# Patient Record
(demographics unavailable — no encounter records)

---

## 2024-12-18 NOTE — PROCEDURE
[FreeTextEntry1] :  	Prior exams reviewed:  PFT: restricted, low volumes, dlco moderately reduced.  declined from prior.  CXR: stable ILD, worse RUL, no effusions     EXAM: [id number] - CT CHEST  - ORDERED BY: NIKOS UNDERWOOD   PROCEDURE DATE:  12/21/2023    INTERPRETATION:  Clinical information: Pulmonary fibrosis. Exam is compared to previous study of 12/19/2022.  CT scan of the chest was obtained without administration of intravenous contrast.  Small calcification is noted in the left lobe of the thyroid gland.  Few small lymph nodes are present in the pretracheal space and AP window.  Heart is normal in size. No pericardial effusion is noted.  No endobronchial lesions are noted.  Again, patchy ground glass/reticular opacities containing inter-/intralobular septal thickening and traction bronchiectasis are noted within both lungs, more so in both upper lobes. No honeycomb cysts are noted. Appearance is unchanged when compared to previous exam. No pleural effusions are noted.  Below the diaphragm, visualized portions of the abdomen demonstrate patient to be status post cholecystectomy.  Degenerative changes of the spine are noted.  IMPRESSION: Stable ground glass/reticular opacities within both lungs when compared to previous exam.  --- End of Report ---       JOSH ASHLEY MD; Attending Radiologist This document has been electronically signed. Dec 26 2023  9:28AM Reviewed:  PFT: restricted.  Lung volumes reduced.   DLCO normal when corrected for volumes.  slight improvement from prior.   EXAM: [id number] - CT CHEST - ORDERED BY: NIKOS UNDERWOOD   PROCEDURE DATE: 12/19/2022    INTERPRETATION: CLINICAL INFORMATION: History of Covid-19 pneumonia, evaluate for interstitial lung disease.  COMPARISON: CT chest 5/22/2020  CONTRAST/COMPLICATIONS: IV Contrast: NONE Oral Contrast: NONE Complications: None reported at time of study completion  PROCEDURE: CT scan of the chest was obtained without intravenous contrast.  FINDINGS:  LYMPH NODES: No thoracic lymphadenopathy.  HEART/VASCULATURE: Normal cardiac size. No pericardial effusion. Atherosclerotic calcifications of the aorta. Nonaneurysmal thoracic aorta. Normal caliber main pulmonary artery.  AIRWAYS/LUNGS/PLEURA: Patent central airways. Prior diffuse lung abnormality evolved to upper lobe and central predominant groundglass, with associated traction bronchiectasis, as well as both peripheral and peribronchovascular reticular opacities. The groundglass component is decreased Calcified granuloma in the right lower lobe (2:64). No pneumothorax. No pleural effusion.  UPPER ABDOMEN: Unremarkable visualized upper abdomen  BONES/SOFT TISSUES: The soft tissues are unremarkable. Degenerative changes of the thoracic spine.   IMPRESSION:  Interstitial lung disease/fibrosis compatible with COVID associated lung injury.    --- End of Report ---    EULALIA JIMENEZ DO; Resident Radiologist This document has been electronically signed. SAMEER KEMP M.D., ATTENDING RADIOGIST This document has been electronically signed. Dec 20 2022 4:15PM Reviewed:  cxr: diffuse bilateral increase interstital markings, no effusions, possible small consolidation RUL, cardiac silhouette appears normal, no richard abnormality.  PFT: restrictive physiology without bd response, lung volumes decreased, DLCO mod reduced.    EXAM: CT ANGIO CHEST (W)AW IC   PROCEDURE DATE: 05/22/2020    INTERPRETATION: CT ANGIO CHEST WITHOUT AND OR WITH IV CONTRAST   CLINICAL INFORMATION: chest pressure, shortness of breath   TECHNIQUE: Contrast enhanced CT pulmonary angiogram was performed.  Multiplanar CT and HRCT images were reviewed. Maximum intensity projection  (MIP) images are reconstructed as per CT angiography protocol. Images were  acquired during the administration of 75 cc Omnipaque 350 IV contrast. This  study was performed using automatic exposure control (radiation dose  reduction software) to obtain a diagnostic image quality scan with patient  dose as low as reasonably achievable.   COMPARISON: Chest x-ray 5/15/2020   PULMONARY ARTERIES: No pulmonary embolism to the proximal segmental  branches. Limited visualization of distal segmental and subsegmental  branches secondary to a combination of respiratory motion and suboptimal  bolus timing.   LUNGS, AIRWAYS: The central airways are patent. Diffuse bilateral  groundglass opacity with coarse septal thickening. Additional areas of  scarring and bronchiectasis.   PLEURA: No pleural abnormality.   VESSELS: Normal caliber aorta. No dissection.   HEART: Normal heart size. No pericardial effusion.   MEDIASTINUM, SURJIT, AXILLAE: No adenopathy.   UPPER ABDOMEN: Limited visualization is unremarkable.   BONES AND CHEST WALL: No acute bony abnormality.   IMPRESSION:   No pulmonary embolism to the proximal segmental branches. Limited  visualization of distal segmental and subsegmental branches secondary to a  combination of respiratory motion and suboptimal bolus timing.   Diffuse bilateral groundglass opacity with coarse septal thickening likely  representing evolution of COVID 19 pneumonia. Additional areas of scarring  and bronchiectasis.

## 2024-12-18 NOTE — HISTORY OF PRESENT ILLNESS
[TextBox_4] : doing well breathing/cough is ok using trelegy but not all days due for ILD ct scan fu

## 2025-01-08 NOTE — PHYSICAL EXAM
[General Appearance - Alert] : alert [General Appearance - Well Nourished] : well nourished [General Appearance - Well-Appearing] : healthy appearing [Oriented To Time, Place, And Person] : oriented to person, place, and time [Person] : oriented to person [Place] : oriented to place [Time] : oriented to time [Motor Tone] : muscle tone was normal in all four extremities [Motor Handedness Right-Handed] : the patient is right hand dominant [Sclera] : the sclera and conjunctiva were normal [Outer Ear] : the ears and nose were normal in appearance [Neck Appearance] : the appearance of the neck was normal [] : no respiratory distress [Respiration, Rhythm And Depth] : normal respiratory rhythm and effort [Exaggerated Use Of Accessory Muscles For Inspiration] : no accessory muscle use [Heart Rate And Rhythm] : heart rate was normal and rhythm regular [Abnormal Walk] : normal gait [Involuntary Movements] : no involuntary movements were seen [Skin Color & Pigmentation] : normal skin color and pigmentation [Limited Balance] : balance was intact

## 2025-01-08 NOTE — ASSESSMENT
[FreeTextEntry1] : IMPRESSION: 58-year-old female right hand dominant female with a history of HTN, DM, HLD, neuropathic pain (on Gabapentin), post Covid ILD, who was admitted 12/8/24 for dizziness, s/p fall, nausea, vomiting generalized weakness.  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   Pt presents for a follow up visit - states that she is feeling well.  Reports brief intermittent left occipital squeezing sensation and a left. Reviewed the CTA with patient and explained the findings. The small MCA aneurysm presents very low risk, with studies suggesting annual rupture risk of less than 1%. Would therefore recommend monitoring with annual MRA.   PLAN: MRA brain non contrast in 6 months F/U after imaging Advised patient to maintain good blood pressure control.

## 2025-01-08 NOTE — HISTORY OF PRESENT ILLNESS
[FreeTextEntry1] : CTA HEAD: 12/7/24 - Patent intracranial circulation without flow limiting stenosis. 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  Olimpia Gonzalez is a 58-year-old female with a history of HTN, DM, HLD, neuropathic pain (on Gabapentin), post Covid ILD, who was admitted 12/8/24 for dizziness, s/p fall, nausea, vomiting generalized weakness.  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   Pt presents for a follow up visit - states that she is feeling well.  Reports brief intermittent left occipital squeezing sensation and a left.  Pt was never a smoker and denies a family history of aneurysms.

## 2025-06-11 NOTE — PHYSICAL EXAM
[Person] : oriented to person [Place] : oriented to place [Time] : oriented to time [Abnormal Walk] : normal gait [Motor Tone] : muscle tone was normal in all four extremities

## 2025-06-12 NOTE — HISTORY OF PRESENT ILLNESS
[FreeTextEntry1] : OMAYRA LOJA is a 59 year old right hand dominant female with PMH of HTN, HLD, DM 2, ILD, arthritis, chronic low back pain, depression  who presented to [facility] ED on 12/7/24 with severe sudden WHOL, dizziness, nausea, difficulty speaking, generalized weakness. CTA head/neck 12/7/24 showed 2mm left MCA bifurcation aneurysm. Attempted lumbar puncture x2 unsuccessful, no CSF obtained. MRI brain negative for hemorrhage or infarct.  Today, patient presents for follow up to review results of MRA of the brain obtained on 6/6/25. No new complaints since our last visit.

## 2025-06-12 NOTE — ASSESSMENT
[FreeTextEntry1] : IMPRESSION: 59F RHD, PMH of HTN, HLD, DM 2, ILD, arthritis, chronic low back pain, depression, p/w WHOL, dizziness, nausea, difficulty speaking, generalized weakness to [facility] ED. CTA head/neck 12/7/24 showed 2mm left MCA bifurcation aneurysm. Attempted lumbar puncture x2 unsuccessful, no CSF obtained. MRI brain negative for hemorrhage or infarct.  Repeat MRA brain w/o 6/6/25 shows stable unchanged small left MCA bifurcation aneurysm. Doing well, no neurological issues. Small, stable unchanged aneurysm. Will continue to monitor conservatively, low risk of rupture. Educated the patient on signs and symptoms of aneurysm rupture or subarachnoid hemorrhage, which is a life-threatening condition. Should she have severe, sudden onset worst headache of her life, advised that she must seek medical attention immediately and go to the emergency room if this occurs.    PLAN: Repeat MRA head non con in 1 year - June 2026 Follow up after for review

## 2025-06-12 NOTE — DATA REVIEWED
[de-identified] : CTA head and neck 12/7/24 CTA head 11/25/2013 [de-identified] : MRA head non con 6/6/25

## 2025-06-12 NOTE — HISTORY OF PRESENT ILLNESS
[FreeTextEntry1] : OMAYRA LOJA is a 59 year old right hand dominant female with PMH of HTN, HLD, DM 2, ILD, arthritis, chronic low back pain, depression  who presented to [facility] ED on 12/7/24 with severe sudden WHOL, dizziness, nausea, difficulty speaking, generalized weakness. CTA head/neck 12/7/24 showed 2mm left MCA bifurcation aneurysm. Attempted lumbar puncture x2 unsuccessful, no CSF obtained. MRI brain negative for hemorrhage or infarct.  Today, patient presents for follow up to review results of MRA of the brain obtained on 6/6/25. No new complaints since our last visit.

## 2025-06-12 NOTE — ASSESSMENT
[FreeTextEntry1] : IMPRESSION: 59F RHD, PMH of HTN, HLD, DM 2, ILD, arthritis, chronic low back pain, depression, p/w WHOL, dizziness, nausea, difficulty speaking, generalized weakness to [facility] ED. CTA head/neck 12/7/24 showed 2mm left MCA bifurcation aneurysm. Attempted lumbar puncture x2 unsuccessful, no CSF obtained. MRI brain negative for hemorrhage or infarct.  Repeat MRA brain w/o 6/6/25 shows stable unchanged small left MCA bifurcation aneurysm. Doing well, no neurological issues. Small, stable unchanged aneurysm. Will continue to monitor conservatively, low risk of rupture. Educated the patient on signs and symptoms of aneurysm rupture or subarachnoid hemorrhage, which is a life-threatening condition. Should she have severe, sudden onset worst headache of her life, advised that she must seek medical attention immediately and go to the emergency room if this occurs.    PLAN: Repeat MRA head non con in 1 year - June 2026 Follow up after for review

## 2025-06-12 NOTE — DATA REVIEWED
[de-identified] : CTA head and neck 12/7/24 CTA head 11/25/2013 [de-identified] : MRA head non con 6/6/25

## 2025-06-12 NOTE — REASON FOR VISIT
[Language Line ] : provided by Language Line   [Interpreters_IDNumber] : [id number] [Interpreters_FullName] : Loretta [[email or web address]] : Iraqi [FreeTextEntry1] : Reviewed repeat MRA brain without contrast done 6/6/25

## 2025-06-12 NOTE — DATA REVIEWED
[de-identified] : CTA head and neck 12/7/24 CTA head 11/25/2013 [de-identified] : MRA head non con 6/6/25

## 2025-06-12 NOTE — ASSESSMENT
[FreeTextEntry1] : IMPRESSION: 59F RHD, PMH of HTN, HLD, DM 2, ILD, arthritis, chronic low back pain, depression, p/w WHOL, dizziness, nausea, difficulty speaking, generalized weakness to [facility] ED. CTA head/neck 12/7/24 showed 2mm left MCA bifurcation aneurysm. Attempted lumbar puncture x2 unsuccessful, no CSF obtained. MRI brain negative for hemorrhage or infarct.  Repeat MRA brain w/o 6/6/25 shows stable unchanged small left MCA bifurcation aneurysm. Doing well, no neurological issues. Small, stable unchanged aneurysm. Will continue to monitor conservatively, low risk of rupture. Educated the patient on signs and symptoms of aneurysm rupture or subarachnoid hemorrhage, which is a life-threatening condition. Should she have severe, sudden onset worst headache of her life, advised that she must seek medical attention immediately and go to the emergency room if this occurs.    PLAN: Repeat MRA head non con in 1 year - June 2026 Follow up after for review

## 2025-06-12 NOTE — HISTORY OF PRESENT ILLNESS
[FreeTextEntry1] : OMAYRA LOJA is a 59 year old right hand dominant female with PMH of HTN, HLD, DM 2, ILD, arthritis, chronic low back pain, depression  who presented to [facility] ED on 12/7/24 with severe sudden WHOL, dizziness, nausea, difficulty speaking, generalized weakness. CTA head/neck 12/7/24 showed 2mm left MCA bifurcation aneurysm. Attempted lumbar puncture x2 unsuccessful, no CSF obtained. MRI brain negative for hemorrhage or infarct.  Today, patient presents for follow up to review results of MRA of the brain obtained on 6/6/25. No new complaints since our last visit.

## 2025-06-12 NOTE — REASON FOR VISIT
[Language Line ] : provided by Language Line   [Interpreters_IDNumber] : [id number] [Interpreters_FullName] : Loretta [TWNoteComboBox1] : Ethiopian [FreeTextEntry1] : Reviewed repeat MRA brain without contrast done 6/6/25

## 2025-06-12 NOTE — REASON FOR VISIT
[Language Line ] : provided by Language Line   [Interpreters_IDNumber] : [id number] [Interpreters_FullName] : Loretta [[email or web address]] : Namibian [FreeTextEntry1] : Reviewed repeat MRA brain without contrast done 6/6/25